# Patient Record
Sex: FEMALE
[De-identification: names, ages, dates, MRNs, and addresses within clinical notes are randomized per-mention and may not be internally consistent; named-entity substitution may affect disease eponyms.]

---

## 2018-01-22 ENCOUNTER — HOSPITAL ENCOUNTER (EMERGENCY)
Dept: HOSPITAL 92 - ERS | Age: 23
Discharge: HOME | End: 2018-01-22
Payer: SELF-PAY

## 2018-01-22 DIAGNOSIS — O20.0: Primary | ICD-10-CM

## 2018-01-22 DIAGNOSIS — Z3A.01: ICD-10-CM

## 2018-01-22 LAB
ALBUMIN SERPL BCG-MCNC: 4.4 G/DL (ref 3.5–5)
ALP SERPL-CCNC: 69 U/L (ref 40–150)
ALT SERPL W P-5'-P-CCNC: 13 U/L (ref 8–55)
ANION GAP SERPL CALC-SCNC: 16 MMOL/L (ref 10–20)
AST SERPL-CCNC: 15 U/L (ref 5–34)
BACTERIA UR QL AUTO: (no result) HPF
BASOPHILS # BLD AUTO: 0 THOU/UL (ref 0–0.2)
BASOPHILS NFR BLD AUTO: 0.9 % (ref 0–1)
BILIRUB SERPL-MCNC: 0.5 MG/DL (ref 0.2–1.2)
BUN SERPL-MCNC: 8 MG/DL (ref 7–18.7)
CALCIUM SERPL-MCNC: 9.4 MG/DL (ref 7.8–10.44)
CHLORIDE SERPL-SCNC: 107 MMOL/L (ref 98–107)
CO2 SERPL-SCNC: 19 MMOL/L (ref 22–29)
CREAT CL PREDICTED SERPL C-G-VRATE: 0 ML/MIN (ref 70–130)
CRYSTAL-AUWI FLAG: 0.8 (ref 0–15)
EOSINOPHIL # BLD AUTO: 0 THOU/UL (ref 0–0.7)
EOSINOPHIL NFR BLD AUTO: 0.8 % (ref 0–10)
GLOBULIN SER CALC-MCNC: 3.1 G/DL (ref 2.4–3.5)
GLUCOSE SERPL-MCNC: 94 MG/DL (ref 70–105)
HEV IGM SER QL: 1.7 (ref 0–7.99)
HGB BLD-MCNC: 13.2 G/DL (ref 12–16)
HYALINE CASTS #/AREA URNS LPF: (no result) LPF
LYMPHOCYTES # BLD: 1.6 THOU/UL (ref 1.2–3.4)
LYMPHOCYTES NFR BLD AUTO: 29.7 % (ref 21–51)
MCH RBC QN AUTO: 33.2 PG (ref 27–31)
MCV RBC AUTO: 96.4 FL (ref 81–99)
MONOCYTES # BLD AUTO: 0.4 THOU/UL (ref 0.11–0.59)
MONOCYTES NFR BLD AUTO: 8.3 % (ref 0–10)
NEUTROPHILS # BLD AUTO: 3.2 THOU/UL (ref 1.4–6.5)
NEUTROPHILS NFR BLD AUTO: 60.3 % (ref 42–75)
PATHC CAST-AUWI FLAG: 0.13 (ref 0–2.49)
PLATELET # BLD AUTO: 271 THOU/UL (ref 130–400)
POTASSIUM SERPL-SCNC: 3.7 MMOL/L (ref 3.5–5.1)
RBC # BLD AUTO: 3.97 MILL/UL (ref 4.2–5.4)
RBC UR QL AUTO: (no result) HPF (ref 0–3)
SODIUM SERPL-SCNC: 138 MMOL/L (ref 136–145)
SP GR UR STRIP: 1.02 (ref 1–1.04)
SPERM-AUWI FLAG: 0 (ref 0–9.9)
WBC # BLD AUTO: 5.2 THOU/UL (ref 4.8–10.8)
WBC UR QL AUTO: (no result) HPF (ref 0–3)
YEAST-AUWI FLAG: 0 (ref 0–25)

## 2018-01-22 PROCEDURE — 81003 URINALYSIS AUTO W/O SCOPE: CPT

## 2018-01-22 PROCEDURE — 86900 BLOOD TYPING SEROLOGIC ABO: CPT

## 2018-01-22 PROCEDURE — 81015 MICROSCOPIC EXAM OF URINE: CPT

## 2018-01-22 PROCEDURE — 86901 BLOOD TYPING SEROLOGIC RH(D): CPT

## 2018-01-22 PROCEDURE — 80053 COMPREHEN METABOLIC PANEL: CPT

## 2018-01-22 PROCEDURE — 87591 N.GONORRHOEAE DNA AMP PROB: CPT

## 2018-01-22 PROCEDURE — 76856 US EXAM PELVIC COMPLETE: CPT

## 2018-01-22 PROCEDURE — 85025 COMPLETE CBC W/AUTO DIFF WBC: CPT

## 2018-01-22 PROCEDURE — 87491 CHLMYD TRACH DNA AMP PROBE: CPT

## 2018-01-22 PROCEDURE — 36415 COLL VENOUS BLD VENIPUNCTURE: CPT

## 2018-01-22 PROCEDURE — 87510 GARDNER VAG DNA DIR PROBE: CPT

## 2018-01-22 PROCEDURE — 87660 TRICHOMONAS VAGIN DIR PROBE: CPT

## 2018-01-22 PROCEDURE — 87480 CANDIDA DNA DIR PROBE: CPT

## 2018-01-22 PROCEDURE — 84702 CHORIONIC GONADOTROPIN TEST: CPT

## 2018-01-22 NOTE — ULT
PELVIC ULTRASOUND:

 

Date:  01/22/18 

 

Transabdominal and endovaginal ultrasound of pelvis performed. 

 

HISTORY:  

Positive pregnancy test. Pelvic cramping. 

 

FINDINGS:

There is a gestational sac seen in the endometrial cavity. A fetal pole is identified and a yolk sac 
is identified. Crown-rump length indicates a 5 week/6 day gestational age. Fetal heart rate could not
 be confirmed with color Doppler and spectral analysis. Both ovaries are identified. There is a right
 ovarian cyst measuring up to 2.0 cm. Color Doppler with spectral analysis demonstrates blood flow to
 both ovaries. 

 

IMPRESSION: 

There is an intrauterine gestation. Viability is not confirmed on this exam as fetal heart tones were
 unable to be obtained. Recommend correlation with serial HCG levels and consider follow-up pelvic ul
trasound. 

 

 

POS: [facility]

## 2018-02-01 ENCOUNTER — HOSPITAL ENCOUNTER (EMERGENCY)
Dept: HOSPITAL 92 - ERS | Age: 23
Discharge: HOME | End: 2018-02-01
Payer: SELF-PAY

## 2018-02-01 DIAGNOSIS — Z3A.01: ICD-10-CM

## 2018-02-01 DIAGNOSIS — O20.0: Primary | ICD-10-CM

## 2018-02-01 PROCEDURE — 99284 EMERGENCY DEPT VISIT MOD MDM: CPT

## 2018-02-01 PROCEDURE — 36415 COLL VENOUS BLD VENIPUNCTURE: CPT

## 2018-02-01 PROCEDURE — 84702 CHORIONIC GONADOTROPIN TEST: CPT

## 2018-04-02 ENCOUNTER — HOSPITAL ENCOUNTER (EMERGENCY)
Dept: HOSPITAL 92 - ERS | Age: 23
Discharge: HOME | End: 2018-04-02
Payer: SELF-PAY

## 2018-04-02 DIAGNOSIS — O99.512: Primary | ICD-10-CM

## 2018-04-02 DIAGNOSIS — Z3A.15: ICD-10-CM

## 2018-04-02 DIAGNOSIS — J06.9: ICD-10-CM

## 2018-04-02 PROCEDURE — 99283 EMERGENCY DEPT VISIT LOW MDM: CPT

## 2018-09-20 ENCOUNTER — HOSPITAL ENCOUNTER (INPATIENT)
Dept: HOSPITAL 92 - L&D | Age: 23
LOS: 2 days | Discharge: HOME | End: 2018-09-22
Attending: FAMILY MEDICINE | Admitting: FAMILY MEDICINE
Payer: COMMERCIAL

## 2018-09-20 VITALS — BODY MASS INDEX: 34.7 KG/M2

## 2018-09-20 DIAGNOSIS — Z3A.39: ICD-10-CM

## 2018-09-20 DIAGNOSIS — D64.9: ICD-10-CM

## 2018-09-20 PROCEDURE — 85027 COMPLETE CBC AUTOMATED: CPT

## 2018-09-20 PROCEDURE — 86850 RBC ANTIBODY SCREEN: CPT

## 2018-09-20 PROCEDURE — 86780 TREPONEMA PALLIDUM: CPT

## 2018-09-20 PROCEDURE — 86901 BLOOD TYPING SEROLOGIC RH(D): CPT

## 2018-09-20 PROCEDURE — 36415 COLL VENOUS BLD VENIPUNCTURE: CPT

## 2018-09-20 PROCEDURE — 86900 BLOOD TYPING SEROLOGIC ABO: CPT

## 2018-09-20 PROCEDURE — 87340 HEPATITIS B SURFACE AG IA: CPT

## 2018-09-21 LAB
HBSAG INDEX: 0.21 S/CO (ref 0–0.99)
HGB BLD-MCNC: 10.1 G/DL (ref 12–16)
MCH RBC QN AUTO: 28.6 PG (ref 27–31)
MCV RBC AUTO: 86.4 FL (ref 78–98)
PLATELET # BLD AUTO: 209 THOU/UL (ref 130–400)
RBC # BLD AUTO: 3.53 MILL/UL (ref 4.2–5.4)
SYPHILIS ANTIBODY INDEX: 0.03 S/CO
WBC # BLD AUTO: 8 THOU/UL (ref 4.8–10.8)

## 2018-09-21 PROCEDURE — 10907ZC DRAINAGE OF AMNIOTIC FLUID, THERAPEUTIC FROM PRODUCTS OF CONCEPTION, VIA NATURAL OR ARTIFICIAL OPENING: ICD-10-PCS | Performed by: FAMILY MEDICINE

## 2018-09-21 PROCEDURE — 3E033VJ INTRODUCTION OF OTHER HORMONE INTO PERIPHERAL VEIN, PERCUTANEOUS APPROACH: ICD-10-PCS | Performed by: FAMILY MEDICINE

## 2018-09-21 RX ADMIN — DOCUSATE CALCIUM SCH MG: 240 CAPSULE, LIQUID FILLED ORAL at 23:17

## 2018-09-21 NOTE — PDOC.LDPN
Labor & Delivery Progress Note





- Subjective


Subjective: comfortable





- Objective


Vital signs reviewed and normal: yes


General: resting


Uterine fundus: non tender


SVE: 2-3/30/-3


FHT: category 1


[place] contractions every: 2-3


Resuscitative measures: maternal IV fluids





- Assessment


(1) Elective induction of labor planned


Code(s): XYG4596 -    Current Visit: Yes   Status: Acute   





(2) Pregnancy


Current Visit: Yes   Status: Acute   


Plan: continue plan of care


-: 


Pt resting comfortably with reassuring FHT. Cervix is making progress dilating 

however pt is addy every 2-3 min


-hold cytotec for 2 hours, recheck in 2 hours, otherwise continue current care.








<Mitchel Nieto - Last Filed: 09/21/18 06:36>





- Assessment


(1) Elective induction of labor planned


Code(s): TUQ5954 -    Current Visit: Yes   Status: Acute   





(2) Pregnancy


Current Visit: Yes   Status: Acute   





<Alex Lanza - Last Filed: 09/21/18 06:44>





Attending Addendum





- Attending Addendum


Date/Time: 09/21/18 0644





I personally evaluated the patient and discussed the management with Dr. Nieto.


I agree with the History, Examination, Assessment and Plan documented above 

with any addition or exceptions noted below.








<Alex Lanza - Last Filed: 09/21/18 06:44>

## 2018-09-21 NOTE — PDOC.LDPN
Labor & Delivery Progress Note





- Subjective


Subjective: painful contractions





- Objective


Vital signs reviewed and normal: yes


General: NAD


Uterine fundus: non tender


SVE: @ 0900 by Dr. Martin


Dilation: 5


Effacement: 50%


Station: -2


FHT: category 1, variability present


[place] contractions every: 2-3 minutes


Procedures: AROM


AROM: clear fluid





- Assessment


(1) Term pregnancy


Code(s): Z34.80 - ENCOUNTER FOR SUPRVSN OF NORMAL PREGNANCY, UNSP TRIMESTER   

Current Visit: Yes   Status: Acute   Comment: 24 y/o  @ 39.6 WGA here 

for elective IOL. 


AROM with clear fluid at 0900. SVE 5/50/-2


-No epidural desired, stadol prn pain


-Will continue expectant management and if pt not making change, will add 

pitocin


-Continuous fetal monitoring   


Plan: continue plan of care

## 2018-09-21 NOTE — PDOC.FPROB
FMR OB H&P: HPI





- History of Present Illness


Chief Complaint: induction of labor


Indentification: 


History of Present Illness: 





22yo  at EGA 39.6 presents for elective induction of labor. Pt has no 

complaints or concerns at this time. Denies Vaginal bleeding/discharge/LOF, 

reports good fetal movement.


Primary Care Physician: 





Lara Martin MD





FMR OB H&P: Current Pregnancy





- Prenatal Care


: 3


Para: 


Gestational age: 39.6


Due date: 2018


Dating Criteria: LMP and 1st trimester US





- OB Labs


Blood type: AB


RH: positive


Antibody Screen: negative


HIV: negative


RPR: negative


HepBsAg: negative


Rubella: immune


1 hour gtt: 90


GBS: negative





FMR OB H&P: History





- Past Medical History


PMH: 





none





- Surgical History


Sx History: 





none





- Social History


Social History: 





Denies tobacco/EtOH/drugs





- Family History


Family History: 





none





FMR OB H&P: Medications





- Current


Home Medications: 


 











 Medication  Instructions  Recorded  Confirmed  Type


 


Prenatal Vit No.126/Iron/Folic  18  History





[Classic Prenatal]    











Allergies/Adverse Reactions: 


 Allergies











Allergy/AdvReac Type Severity Reaction Status Date / Time


 


No Known Allergies Allergy   Verified 18 00:16














FMR OB H&P: ROS





- Review of Systems


General: denies: fever/chills, fatigue


Eyes: denies: eye pain, vision changes


ENT: denies: nasal congestion, rhinorrhea, sore throat


Cardiovascular: denies: chest pain, palpitation


Respiratory: denies: cough, congestion, shortness of breath


Gastrointestinal: reports: other (pressure).  denies: abdominal pain, cramping


Genitourinary (Female): denies: dysuria, hematuria


Musculoskeletal: denies: stiffness, tenderness


Neurologic: denies: syncope, seizures


Integumentary: denies: rash, lesions


Hematologic/Lymphatic: denies: prolonged or excessive bleeding





FMR OB H&P: Vital Signs





- Maternal


Vital signs: 


 Vital Signs - First Documented











Temp Pulse Resp BP


 


 98.7 F   80   16   132/82 


 


 18 00:13  18 00:13  18 00:13  18 00:13














- Fetal Heart Tones


Variability: moderate


Acceleration: present


Deceleration: absent


Category: category 1


[place] contractions every: 9





FMR OB H&P: Physical Exam





- Physical Exam


General: NAD, awake, alert and oriented


HEENT: normocephalic and atraumatic, EOMI, grossly normal hearing, normal nasal 

mucosa


Neck: trachea midline


Chest: non-tender to palpation


Heart: RRR, normal S1/S2


General: CTAB, no respiratory distress, good air movement


Abdomen: soft, gravid, bowel sound present


Musculoskeletal: pulses present


Neurological: no tremor, no focal deficit


Skin: no rash, good tugor


Lymphatic: no purpura, no petechia


Psychiatric: intact recent and remote memory, good judgement and insight





- Pelvic Exam


SVE: 1/thick/-3


Perez score: 2





FMR OB H&P: A/P





- Problem List


(1) Elective induction of labor planned


Current Visit: Yes   Status: Acute   Code(s): PMQ4285 -    





(2) Pregnancy


Current Visit: Yes   Status: Acute   


Disposition: 


22yo  at EGA 39.6 presents for elective induction of labor. Pt doing well

, addy every 9 min with Perez score 2. GBS -. Pregnancy complicated by 

anemia of pregnancy. Last hgbs have 10-11.


-Will start Cytotec Q4hr


-type and screen


-H&H


-recheck in 4 hours


-monitor contractions and vital signs








Attending Addendum





- Attending Addendum


Date/Time: 18 0075





I personally evaluated the patient and discussed the management with Dr. Nieto.


I agree with and repeated the History, Examination, Assessment and Plan 

documented above with any addition or exceptions noted below.





Elective induction.  Unfavorable.  Plan for cytotec PV.

## 2018-09-21 NOTE — PDOC.OPDEL
OB Operative/Delivery Note


Delivery Dr/Surgeon: Dr. Martin with attending Dr. Lemus


Assist: Dr. Dooley


Pre-Delivery Diagnosis: elective induction


Procedure/Post Delivery Dx: spontaneous vaginal delivery


Weeks gestation: 39 (39w6d)


Anesthesia: none





- Findings


  ** A


Sex: male


Apgar - 1 min: 9


Apgar - 5 min: 9





- Additional Findings/Plan


Placenta delivered: spontaneous


Repaired Obstetrical Laceration: none


Compilations/Other Findings: 


This is a 23 year old female  @ 39.6 wks who delivered a viable M infant 

at 1119 on 18. Following an uneventful antepartum course, a vigorous male 

was delivered over an intact perineum in the occiput-anterior position.  

Anterior Shoulder and then remainder of the body delivered.  No nuchal cord. 

The head was held down and mouth and nares were bulb suctioned. Cord clamped 

and cut and cord blood collected. Placenta delivered intact with a 3 vessel 

cord noted. Fundal massage was performed and the fundus was initially boggy, so 

0.2mg methergine was administered and bimanual massage was performed until the 

uterus was found to be firm. The cervix and vagina were inspected and found to 

be free of lacerations. Infant went to  nursery in good condition for 

routine care.  Apgars were 9&9 at 1 & 5 minutes, respectively.  Patient 

tolerated delivery well and went to postpartum after routine recovery/care.





QBL: 456mL


Post delivery plan: routine recovery





<Lara Martin - Last Filed: 18 12:03>





Attending Addendum





- Attending Addendum


Date/Time: 18 1614


I was present for and assisted in the entire uncomplicated vaginal delivery of 

vigorous . I agree with the above documented findings.








<Talisha Lemus - Last Filed: 18 16:15>

## 2018-09-22 VITALS — DIASTOLIC BLOOD PRESSURE: 55 MMHG | TEMPERATURE: 97.5 F | SYSTOLIC BLOOD PRESSURE: 105 MMHG

## 2018-09-22 LAB
HGB BLD-MCNC: 9.6 G/DL (ref 12–16)
MCH RBC QN AUTO: 28.5 PG (ref 27–31)
MCV RBC AUTO: 87.4 FL (ref 78–98)
PLATELET # BLD AUTO: 192 THOU/UL (ref 130–400)
RBC # BLD AUTO: 3.38 MILL/UL (ref 4.2–5.4)
WBC # BLD AUTO: 11 THOU/UL (ref 4.8–10.8)

## 2018-09-22 RX ADMIN — DOCUSATE CALCIUM SCH MG: 240 CAPSULE, LIQUID FILLED ORAL at 09:59

## 2018-09-22 NOTE — PDOC.OBPPN
FMR OB Postpartum PN: Subj





- Interval History


Hospital Day: 2


Postpartum Day: 


1


24 y/o ->3 who delivered at 39w6d via . 


Patient doing well. Reports that her abdominal pain has been very well 

controlled with motrin. She denies much vaginal bleeding. She has been 

ambulating without difficulty. Endorses flatus. Denies headaches, 

lightheadedness, dizziness, vision changes, SOB. She reports that her LE 

swelling has improved some. 





FMR OB Postpartum PN: Obj





- Maternal


Vital signs: 


BP: 105/55  HR: 54 RR: 16 Tmax: 97.5 Wt: 88.9 kg   








- Urine output


I&O: 


 











 18





 06:59 06:59 06:59


 


Output Total  32 


 


Balance  -32 














- Lochia


Lochia: 


minimal





FMR OB Postpartum PN: Exam





- Physical Exam


General: NAD, awake, alert and oriented


HEENT: EOMI, MMM


Heart: RRR, normal S1/S2, no murmurs/rubs/gallops, pulses present, other (BLE 

non-pitting edema)


General: CTAB, no respiratory distress, good air movement, no rales/rhonchi, no 

wheezing


Abdomen: soft, fundus(cm) (1cm below umbilicus), non-tender, bowel sound present


Musculoskeletal: pulses present, FROM in all four extremities


Skin: good tugor, capillary refill <2 seconds


Psychiatric: intact recent and remote memory, good judgement and insight, 

normal mood and affect





FMR OB Postpartum PN: Data





- Labs


Lab results: 


 Laboratory Results - last 24 hr











  18





  05:32


 


WBC  11.0 H


 


RBC  3.38 L


 


Hgb  9.6 L


 


Hct  29.5 L


 


MCV  87.4


 


MCH  28.5


 


MCHC  32.6


 


RDW  14.0


 


Plt Count  192


 


MPV  10.1














FMR OB Postpartum PN: A/P





- Problem List


(1) Term pregnancy delivered


Current Visit: Yes   Status: Acute   Code(s): O80 - ENCOUNTER FOR FULL-TERM 

UNCOMPLICATED DELIVERY   


Assessment and Plan: 


24 y/o ->3 who delivered at 39w6d via .


-Continue routine post-partum care


-Ibuprofen for pain control


-Encourage ambulation


-Pt tolerating PO


-Encouraged breast feeding, but pt elects to bottle feed. Counseled on the 

benefits of breast feeding


-Pt desires to go home this PM if possible. d/c home this PM pending bili in 










(2) Anemia affecting pregnancy


Current Visit: Yes   Status: Acute   Code(s): O99.019 - ANEMIA COMPLICATING 

PREGNANCY, UNSPECIFIED TRIMESTER   


Qualifiers: 


   Trimester: unspecified trimester   Qualified Code(s): O99.019 - Anemia 

complicating pregnancy, unspecified trimester   


Assessment and Plan: 


Pt had anemia of pregnancy and was on iron. 


Hb dropped from 10.1->9.6 postpartum, which was an appropriate drop


-Continue ferrous sulfate


-Docusate





Disposition: 


d/c home this afternoon


Discussion: 


Date/Time: 18 1498











This progress note was discussed with Dr. Red who agrees with the above 

documentation and plan.








Attending Addendum





- Attending Addendum


Date/Time: 18 8502





I personally evaluated the patient and discussed the management with Dr. Martin


I agree with the History, Examination, Assessment and Plan documented above 

with any addition or exceptions noted below.





22 yo  female s/p uncomplicated  at 39.6 wks


Doing well. No complications. 


1. s/p : Routine care. Bottle feeding. Lochia appropriate. Fundus firm and 

nontender. VS stable. Pain controlled. Request early d/c. 


Will follow up this afternoon for possible early d/c. 


[place]